# Patient Record
Sex: FEMALE | Race: BLACK OR AFRICAN AMERICAN | Employment: UNEMPLOYED | URBAN - METROPOLITAN AREA
[De-identification: names, ages, dates, MRNs, and addresses within clinical notes are randomized per-mention and may not be internally consistent; named-entity substitution may affect disease eponyms.]

---

## 2018-07-24 ENCOUNTER — HOSPITAL ENCOUNTER (EMERGENCY)
Age: 28
Discharge: HOME | End: 2018-07-24
Attending: OBSTETRICS & GYNECOLOGY | Admitting: OBSTETRICS & GYNECOLOGY
Payer: MEDICAID

## 2018-07-24 VITALS
TEMPERATURE: 98.5 F | WEIGHT: 180 LBS | HEART RATE: 95 BPM | DIASTOLIC BLOOD PRESSURE: 78 MMHG | HEIGHT: 65 IN | BODY MASS INDEX: 29.99 KG/M2 | RESPIRATION RATE: 20 BRPM | SYSTOLIC BLOOD PRESSURE: 131 MMHG

## 2018-07-24 LAB
ABO + RH BLD: NORMAL
ALBUMIN SERPL-MCNC: 2.8 G/DL (ref 3.5–5)
ALBUMIN/GLOB SERPL: 0.8 {RATIO} (ref 1.2–3.5)
ALP SERPL-CCNC: 40 U/L (ref 50–136)
ALT SERPL-CCNC: 19 U/L (ref 12–65)
ANION GAP SERPL CALC-SCNC: 7 MMOL/L (ref 7–16)
AST SERPL-CCNC: 14 U/L (ref 15–37)
BASOPHILS # BLD: 0 K/UL (ref 0–0.2)
BASOPHILS NFR BLD: 0 % (ref 0–2)
BILIRUB SERPL-MCNC: 0.1 MG/DL (ref 0.2–1.1)
BLOOD GROUP ANTIBODIES SERPL: NORMAL
BUN SERPL-MCNC: 8 MG/DL (ref 6–23)
CALCIUM SERPL-MCNC: 8.4 MG/DL (ref 8.3–10.4)
CHLORIDE SERPL-SCNC: 104 MMOL/L (ref 98–107)
CO2 SERPL-SCNC: 25 MMOL/L (ref 21–32)
CREAT SERPL-MCNC: 0.53 MG/DL (ref 0.6–1)
DIFFERENTIAL METHOD BLD: ABNORMAL
EOSINOPHIL # BLD: 0.1 K/UL (ref 0–0.8)
EOSINOPHIL NFR BLD: 1 % (ref 0.5–7.8)
ERYTHROCYTE [DISTWIDTH] IN BLOOD BY AUTOMATED COUNT: 14.1 % (ref 11.9–14.6)
GLOBULIN SER CALC-MCNC: 3.5 G/DL (ref 2.3–3.5)
GLUCOSE SERPL-MCNC: 82 MG/DL (ref 65–100)
GLUCOSE, GLUUPC: NEGATIVE
HCT VFR BLD AUTO: 34.4 % (ref 35.8–46.3)
HGB BLD-MCNC: 11.3 G/DL (ref 11.7–15.4)
IMM GRANULOCYTES # BLD: 0 K/UL (ref 0–0.5)
IMM GRANULOCYTES NFR BLD AUTO: 0 % (ref 0–5)
KETONES UR-MCNC: NEGATIVE MG/DL
LACTATE BLD-SCNC: 0.7 MMOL/L (ref 0.5–1.9)
LYMPHOCYTES # BLD: 2.3 K/UL (ref 0.5–4.6)
LYMPHOCYTES NFR BLD: 33 % (ref 13–44)
MCH RBC QN AUTO: 28 PG (ref 26.1–32.9)
MCHC RBC AUTO-ENTMCNC: 32.8 G/DL (ref 31.4–35)
MCV RBC AUTO: 85.1 FL (ref 79.6–97.8)
MONOCYTES # BLD: 0.6 K/UL (ref 0.1–1.3)
MONOCYTES NFR BLD: 8 % (ref 4–12)
NEUTS SEG # BLD: 3.8 K/UL (ref 1.7–8.2)
NEUTS SEG NFR BLD: 58 % (ref 43–78)
PLATELET # BLD AUTO: 168 K/UL (ref 150–450)
PMV BLD AUTO: 11 FL (ref 10.8–14.1)
POTASSIUM SERPL-SCNC: 3.9 MMOL/L (ref 3.5–5.1)
PROT SERPL-MCNC: 6.3 G/DL (ref 6.3–8.2)
PROT UR QL: NORMAL
RBC # BLD AUTO: 4.04 M/UL (ref 4.05–5.25)
SODIUM SERPL-SCNC: 136 MMOL/L (ref 136–145)
SPECIMEN EXP DATE BLD: NORMAL
WBC # BLD AUTO: 6.8 K/UL (ref 4.3–11.1)

## 2018-07-24 PROCEDURE — 81002 URINALYSIS NONAUTO W/O SCOPE: CPT | Performed by: OBSTETRICS & GYNECOLOGY

## 2018-07-24 PROCEDURE — 83605 ASSAY OF LACTIC ACID: CPT

## 2018-07-24 PROCEDURE — 86850 RBC ANTIBODY SCREEN: CPT | Performed by: OBSTETRICS & GYNECOLOGY

## 2018-07-24 PROCEDURE — 74011250637 HC RX REV CODE- 250/637: Performed by: OBSTETRICS & GYNECOLOGY

## 2018-07-24 PROCEDURE — 99283 EMERGENCY DEPT VISIT LOW MDM: CPT | Performed by: OBSTETRICS & GYNECOLOGY

## 2018-07-24 PROCEDURE — 85025 COMPLETE CBC W/AUTO DIFF WBC: CPT | Performed by: OBSTETRICS & GYNECOLOGY

## 2018-07-24 PROCEDURE — 80053 COMPREHEN METABOLIC PANEL: CPT | Performed by: OBSTETRICS & GYNECOLOGY

## 2018-07-24 PROCEDURE — 87491 CHLMYD TRACH DNA AMP PROBE: CPT | Performed by: OBSTETRICS & GYNECOLOGY

## 2018-07-24 PROCEDURE — 76815 OB US LIMITED FETUS(S): CPT

## 2018-07-24 RX ORDER — ACETAMINOPHEN 325 MG/1
650 TABLET ORAL ONCE
Status: COMPLETED | OUTPATIENT
Start: 2018-07-25 | End: 2018-07-24

## 2018-07-24 RX ADMIN — ACETAMINOPHEN 650 MG: 325 TABLET ORAL at 23:26

## 2018-07-24 NOTE — IP AVS SNAPSHOT
[address] [facility] [address] [facility] 
[phone] Patient: Bennie Ken 
MRN: [id number] Eliza Tobar About your hospitalization You were admitted on:  N/A You last received care in the:  E [facility] You were discharged on:  July 24, 2018 Why you were hospitalized Your primary diagnosis was:  Not on File Follow-up Information Follow up With Details Comments Contact Info Mckenna Valdivia MD   [address] [facility] Discharge Orders None A check brianna indicates which time of day the medication should be taken. My Medications Notice You have not been prescribed any medications. Discharge Instructions 7/24/2018 RE: Bennie Ken 
 
 
To Whom it May Concern: This is to certify that Bennie Ken was seen in the [facility] 7/24 and is not to work the night of the 24th. May return to work on 7/25 Thank you for your assistance in this matter. Sincerely, 
Armida Villasenor Introducing [facility]! [facility] introduces [employer] patient portal. Now you can access parts of your medical record, email your doctor's office, and request medication refills online. 1. In your internet browser, go to [email or web address] 2. Click on the First Time User? Click Here link in the Sign In box. You will see the New Member Sign Up page. 3. Enter your [employer] Access Code exactly as it appears below. You will not need to use this code after youve completed the sign-up process. If you do not sign up before the expiration date, you must request a new code. · [employer] Access Code: [email or web address] Expires: 10/22/2018 11:25 PM 
 
4. Enter the last four digits of your Social Security Number (xxxx) and Date of Birth (mm/dd/yyyy) as indicated and click Submit. You will be taken to the next sign-up page. 5. Create a Bizimply ID. This will be your [employer] login ID and cannot be changed, so think of one that is secure and easy to remember. 6. Create a [employer] password. You can change your password at any time. 7. Enter your Password Reset Question and Answer. This can be used at a later time if you forget your password. 8. Enter your e-mail address. You will receive e-mail notification when new information is available in [facility] 9. Click Sign Up. You can now view and download portions of your medical record. 10. Click the Download Summary menu link to download a portable copy of your medical information. If you have questions, please visit the Frequently Asked Questions section of the [employer] website. Remember, [employer] is NOT to be used for urgent needs. For medical emergencies, dial 911. Now available from your iPhone and Android! Introducing Joel Anderson As a [employer] patient, I wanted to make you aware of our electronic visit tool called Joel Anderson. [employer] 24/7 allows you to connect within minutes with a medical provider 24 hours a day, seven days a week via a mobile device or tablet or logging into a secure website from your computer. You can access Joel Anderson from anywhere in the [place]. A virtual visit might be right for you when you have a simple condition and feel like you just dont want to get out of bed, or cant get away from work for an appointment, when your regular [employer] provider is not available (evenings, weekends or holidays), or when youre out of town and need minor care. Electronic visits cost only $49 and if the [employer] 24/7 provider determines a prescription is needed to treat your condition, one can be electronically transmitted to a nearby pharmacy*. Please take a moment to enroll today if you have not already done so. The enrollment process is free and takes just a few minutes.   To enroll, please download the [employer] 24/7 anna to your tablet or phone, or visit [email or web address] to enroll on your computer. And, as an [address] [place] [address] patient with a [employer] account, the results of your visits will be scanned into your electronic medical record and your primary care provider will be able to view the scanned results. We urge you to continue to see your regular [employer] provider for your ongoing medical care. And while your primary care provider may not be the one available when you seek a [employer] virtual visit, the peace of mind you get from getting a real diagnosis real time can be priceless. For more information on [employer], view our Frequently Asked Questions (FAQs) at [email or web address]. Sincerely, 
 
Valentino Milks, MD 
Chief Medical Officer Mag Chavarria *:  certain medications cannot be prescribed via [employer] Unresulted Labs-Please follow up with your PCP about these lab tests Order Current Status Danya Oar / GC-AMPLIFIED In process Providers Seen During Your Hospitalization Provider Specialty Primary office phone Uday Castro MD Obstetrics & Gynecology [phone] Your Primary Care Physician (PCP) Primary Care Physician Office Phone Office Fax Afshan Morgan [phone] You are allergic to the following No active allergies Recent Documentation Height Weight BMI OB Status Smoking Status 1.651 m 81.6 kg 29.95 kg/m2 Recent pregnancy Current Every Day Smoker Emergency Contacts Name Discharge Info Relation Home Work Mobile John Reno  Child [2] [phone] Patient Belongings The following personal items are in your possession at time of discharge: 
                             
 
  
  
 Please provide this summary of care documentation to your next provider. Signatures-by signing, you are acknowledging that this After Visit Summary has been reviewed with you and you have received a copy. Patient Signature:  ____________________________________________________________ Date:  ____________________________________________________________  
  
Jordan Maritza Provider Signature:  ____________________________________________________________ Date:  ____________________________________________________________

## 2018-07-25 NOTE — PROGRESS NOTES
Pt without exact due date as she has not had an initial OB appt, had an ultrasound done 6/19 when she was told she was 12wks and feels she is 'around 18wks'.  Thinks she was told the NIALL is the end of December

## 2018-07-25 NOTE — PROGRESS NOTES
Ultrasound done by Siena Rizzo, per Md no need to check Heart tones as lrg amount of fetal movement noted on ultrasound. Denies Drug/ETOH use, reports being a current smoker.

## 2018-07-25 NOTE — PROGRESS NOTES
Pt reports having just had Rhogam on 6/19.  Siena schaefer was made aware and Rhogam orders were d.c

## 2018-07-25 NOTE — ED PROVIDER NOTES
Chief Complaint: abdominal pain      32 y.o. female  at 25 weeks  weeks gestation who is seen for moderate abdominal pain. Pt has only had one ob appt which was on . She states that at that time she had a dating ultrasound and she was given rhogam for vaginal bleeding. Because of problems with insurance, she has not had another appt. Pt states that she worked last night and slept today. When she woke up she had a bulge above her umbilicus and abdominal pain. She ate in hopes it would alleviate her pain. It did not help, but it did not make it worse. She states she had some cramping and vaginal bleeding. No loss of fluid. No vomiting. No constipation. No fever. No hx of hernias. + hx closely spaced pregnancy. Pt c/o of headache . She states is from [facility]. \"  Incidentally, pt requests std screen- recent breakup with boyfriend        HISTORY:    History   Sexual Activity    Sexual activity: No     No LMP recorded. Social History     Social History    Marital status: SINGLE     Spouse name: N/A    Number of children: 2    Years of education: 12     Occupational History    Not on file. Social History Main Topics    Smoking status: Current Every Day Smoker     Packs/day: 0.25    Smokeless tobacco: Never Used    Alcohol use No      Comment: weekends    Drug use: No    Sexual activity: No     Other Topics Concern    Not on file     Social History Narrative    ** Merged History Encounter **            Past Surgical History:   Procedure Laterality Date    HX OTHER SURGICAL      cyst removed       Past Medical History:   Diagnosis Date    Other ill-defined conditions(830.89)     Pregnant     Thyroid activity decreased     hypothyroid; not on meds         ROS:  A 12 point review of symptoms negative except for chief complaint as described above. PHYSICAL EXAM:  Blood pressure 131/78, pulse 95, temperature 98.5 °F (36.9 °C), resp.  rate 20, height 5' 5\" (1.651 m), weight 81.6 kg (180 lb), unknown if currently breastfeeding. Constitutional: The patient appears well, alert, oriented x 3. Crying on arrival.    Cardiovascular: Heart RRR, no murmurs. Respiratory: Lungs clear, no respiratory distress  GI: Abdomen soft, golf ball size bulge above umbilicus, smaller when laying flat, tender with palpation of that area, seems to reduce with gentle pressure, but pt consistently c/o pain  No fundal tenderness  Musculoskeletal: no cva tenderness  Upper ext: no edema, reflexes +2  Lower ext: no edema, neg sarah's, reflexes +2  Skin: no rashes or lesions  Psychiatric:Mood/ Affect: appropriate  Genitourinary: SVE:cl/th, no vag bleeding   FHT: +  TOCO:no contractions  Bedside ultrasound- lots of fetal movement, grade 1 placenta, subjectively normal fluid  Recent Results (from the past 12 hour(s))   POC URINE DIPSTICK MANUAL    Collection Time: 07/24/18  9:45 PM   Result Value Ref Range    Protein (POC) 30 mg/dL Negative    Glucose, urine (POC) Negative Negative    Ketones (POC) Negative Negative   CBC WITH AUTOMATED DIFF    Collection Time: 07/24/18 10:35 PM   Result Value Ref Range    WBC 6.8 4.3 - 11.1 K/uL    RBC 4.04 (L) 4.05 - 5.25 M/uL    HGB 11.3 (L) 11.7 - 15.4 g/dL    HCT 34.4 (L) 35.8 - 46.3 %    MCV 85.1 79.6 - 97.8 FL    MCH 28.0 26.1 - 32.9 PG    MCHC 32.8 31.4 - 35.0 g/dL    RDW 14.1 11.9 - 14.6 %    PLATELET 819 424 - 334 K/uL    MPV 11.0 10.8 - 14.1 FL    DF AUTOMATED      NEUTROPHILS 58 43 - 78 %    LYMPHOCYTES 33 13 - 44 %    MONOCYTES 8 4.0 - 12.0 %    EOSINOPHILS 1 0.5 - 7.8 %    BASOPHILS 0 0.0 - 2.0 %    IMMATURE GRANULOCYTES 0 0.0 - 5.0 %    ABS. NEUTROPHILS 3.8 1.7 - 8.2 K/UL    ABS. LYMPHOCYTES 2.3 0.5 - 4.6 K/UL    ABS. MONOCYTES 0.6 0.1 - 1.3 K/UL    ABS. EOSINOPHILS 0.1 0.0 - 0.8 K/UL    ABS. BASOPHILS 0.0 0.0 - 0.2 K/UL    ABS. IMM.  GRANS. 0.0 0.0 - 0.5 K/UL   METABOLIC PANEL, COMPREHENSIVE    Collection Time: 07/24/18 10:35 PM   Result Value Ref Range    Sodium 136 136 - 145 mmol/L Potassium 3.9 3.5 - 5.1 mmol/L    Chloride 104 98 - 107 mmol/L    CO2 25 21 - 32 mmol/L    Anion gap 7 7 - 16 mmol/L    Glucose 82 65 - 100 mg/dL    BUN 8 6 - 23 MG/DL    Creatinine 0.53 (L) 0.6 - 1.0 MG/DL    GFR est AA >60 >60 ml/min/1.73m2    GFR est non-AA >60 >60 ml/min/1.73m2    Calcium 8.4 8.3 - 10.4 MG/DL    Bilirubin, total 0.1 (L) 0.2 - 1.1 MG/DL    ALT (SGPT) 19 12 - 65 U/L    AST (SGOT) 14 (L) 15 - 37 U/L    Alk.  phosphatase 40 (L) 50 - 136 U/L    Protein, total 6.3 6.3 - 8.2 g/dL    Albumin 2.8 (L) 3.5 - 5.0 g/dL    Globulin 3.5 2.3 - 3.5 g/dL    A-G Ratio 0.8 (L) 1.2 - 3.5     TYPE & SCREEN    Collection Time: 18 10:35 PM   Result Value Ref Range    Crossmatch Expiration 2018     ABO/Rh(D) A NEGATIVE     Antibody screen NEG    POC LACTIC ACID    Collection Time: 18 10:58 PM   Result Value Ref Range    Lactic Acid (POC) 0.7 0.5 - 1.9 mmol/L         I personally reviewed pt's medical record including relevant labs and ultrasounds    Assessment/Plan:  31 yo  at 18 weeks with small abd wall hernia- seems to have reduced while in triage; pain improved while in triage; normal labs  Discussed at length with pt- need to return to main ED if fever, vomiting, increase in pain  -no evidence  labor  -rh negative- had planned to give rhogam for pt report of vaginal bleeding (I saw no blood on exam) however, pt had rhogam one month ago   - gc/ chlam sent per pt request  - will schedule f/u with st. Kati rivera md

## 2018-07-25 NOTE — PROGRESS NOTES
Pt talat home, educated to return to [facility] if any increase in her pain, vaginal bleeding, feels like she broke her water.  Pt agrees to POC to talat home, aware she needs to set up to have her prenatal care continue with [place] provider of her choice (pt expressed she no longer wishes to continue care with [facility]) Ambulated off unit

## 2018-07-25 NOTE — DISCHARGE INSTRUCTIONS
7/24/2018      RE: Patricia Hoffman      To Whom it May Concern: This is to certify that Patricia Hoffman was seen in the [facility] 7/24 and is not to work the night of the 24th. May return to work on 7/25      Thank you for your assistance in this matter.     Sincerely,  Maggie Lee

## 2018-07-25 NOTE — PROGRESS NOTES
Pt c.o abdominal \"knot\" that she noticed today (area of abdominal swelling noted in umbilical area). Also c.o no feeling well, vaginal spotting, cramping and h/a. States pain is an 7/7  States she went to [facility] this afternoon were she felt she was not properly evaluated. States she has had insurance coverage difficulties. Pt noted to be speaking very rapidly/crying when she presented to the [employer] desk and when waiting in waiting room, resting with eyes closed.

## 2018-07-28 LAB
C TRACH RRNA SPEC QL NAA+PROBE: NEGATIVE
N GONORRHOEA RRNA SPEC QL NAA+PROBE: NEGATIVE
SPECIMEN SOURCE: NORMAL